# Patient Record
Sex: FEMALE | Race: ASIAN | NOT HISPANIC OR LATINO | URBAN - METROPOLITAN AREA
[De-identification: names, ages, dates, MRNs, and addresses within clinical notes are randomized per-mention and may not be internally consistent; named-entity substitution may affect disease eponyms.]

---

## 2021-01-26 ENCOUNTER — EMERGENCY (EMERGENCY)
Facility: HOSPITAL | Age: 51
LOS: 1 days | Discharge: HOME | End: 2021-01-26
Attending: HOSPITALIST | Admitting: EMERGENCY MEDICINE
Payer: MEDICAID

## 2021-01-26 VITALS
SYSTOLIC BLOOD PRESSURE: 144 MMHG | HEART RATE: 90 BPM | TEMPERATURE: 98 F | OXYGEN SATURATION: 100 % | DIASTOLIC BLOOD PRESSURE: 80 MMHG | RESPIRATION RATE: 18 BRPM

## 2021-01-26 LAB
ALBUMIN SERPL ELPH-MCNC: 4.1 G/DL — SIGNIFICANT CHANGE UP (ref 3.3–5)
ALP SERPL-CCNC: 29 U/L — LOW (ref 40–120)
ALT FLD-CCNC: 13 U/L — SIGNIFICANT CHANGE UP (ref 4–33)
ANION GAP SERPL CALC-SCNC: 11 MMOL/L — SIGNIFICANT CHANGE UP (ref 7–14)
APTT BLD: 23.3 SEC — LOW (ref 27–36.3)
AST SERPL-CCNC: 50 U/L — HIGH (ref 4–32)
BASOPHILS # BLD AUTO: 0.03 K/UL — SIGNIFICANT CHANGE UP (ref 0–0.2)
BASOPHILS NFR BLD AUTO: 0.4 % — SIGNIFICANT CHANGE UP (ref 0–2)
BILIRUB SERPL-MCNC: <0.2 MG/DL — SIGNIFICANT CHANGE UP (ref 0.2–1.2)
BLD GP AB SCN SERPL QL: NEGATIVE — SIGNIFICANT CHANGE UP
BUN SERPL-MCNC: 11 MG/DL — SIGNIFICANT CHANGE UP (ref 7–23)
CALCIUM SERPL-MCNC: 8.4 MG/DL — SIGNIFICANT CHANGE UP (ref 8.4–10.5)
CHLORIDE SERPL-SCNC: 102 MMOL/L — SIGNIFICANT CHANGE UP (ref 98–107)
CO2 SERPL-SCNC: 24 MMOL/L — SIGNIFICANT CHANGE UP (ref 22–31)
CREAT SERPL-MCNC: 0.53 MG/DL — SIGNIFICANT CHANGE UP (ref 0.5–1.3)
EOSINOPHIL # BLD AUTO: 0.13 K/UL — SIGNIFICANT CHANGE UP (ref 0–0.5)
EOSINOPHIL NFR BLD AUTO: 1.6 % — SIGNIFICANT CHANGE UP (ref 0–6)
GLUCOSE SERPL-MCNC: 93 MG/DL — SIGNIFICANT CHANGE UP (ref 70–99)
HCT VFR BLD CALC: 25.7 % — LOW (ref 34.5–45)
HGB BLD-MCNC: 7.6 G/DL — LOW (ref 11.5–15.5)
IANC: 5.34 K/UL — SIGNIFICANT CHANGE UP (ref 1.5–8.5)
IMM GRANULOCYTES NFR BLD AUTO: 1.2 % — SIGNIFICANT CHANGE UP (ref 0–1.5)
INR BLD: 1.11 RATIO — SIGNIFICANT CHANGE UP (ref 0.88–1.16)
LYMPHOCYTES # BLD AUTO: 2.12 K/UL — SIGNIFICANT CHANGE UP (ref 1–3.3)
LYMPHOCYTES # BLD AUTO: 25.8 % — SIGNIFICANT CHANGE UP (ref 13–44)
MCHC RBC-ENTMCNC: 27.1 PG — SIGNIFICANT CHANGE UP (ref 27–34)
MCHC RBC-ENTMCNC: 29.6 GM/DL — LOW (ref 32–36)
MCV RBC AUTO: 91.8 FL — SIGNIFICANT CHANGE UP (ref 80–100)
MONOCYTES # BLD AUTO: 0.51 K/UL — SIGNIFICANT CHANGE UP (ref 0–0.9)
MONOCYTES NFR BLD AUTO: 6.2 % — SIGNIFICANT CHANGE UP (ref 2–14)
NEUTROPHILS # BLD AUTO: 5.34 K/UL — SIGNIFICANT CHANGE UP (ref 1.8–7.4)
NEUTROPHILS NFR BLD AUTO: 64.8 % — SIGNIFICANT CHANGE UP (ref 43–77)
NRBC # BLD: 0 /100 WBCS — SIGNIFICANT CHANGE UP
NRBC # FLD: 0.02 K/UL — HIGH
PLATELET # BLD AUTO: 312 K/UL — SIGNIFICANT CHANGE UP (ref 150–400)
POTASSIUM SERPL-MCNC: 5.4 MMOL/L — HIGH (ref 3.5–5.3)
POTASSIUM SERPL-SCNC: 5.4 MMOL/L — HIGH (ref 3.5–5.3)
PROT SERPL-MCNC: 7.1 G/DL — SIGNIFICANT CHANGE UP (ref 6–8.3)
PROTHROM AB SERPL-ACNC: 12.6 SEC — SIGNIFICANT CHANGE UP (ref 10.6–13.6)
RBC # BLD: 2.8 M/UL — LOW (ref 3.8–5.2)
RBC # FLD: 14.1 % — SIGNIFICANT CHANGE UP (ref 10.3–14.5)
RH IG SCN BLD-IMP: POSITIVE — SIGNIFICANT CHANGE UP
RH IG SCN BLD-IMP: POSITIVE — SIGNIFICANT CHANGE UP
SODIUM SERPL-SCNC: 137 MMOL/L — SIGNIFICANT CHANGE UP (ref 135–145)
WBC # BLD: 8.23 K/UL — SIGNIFICANT CHANGE UP (ref 3.8–10.5)
WBC # FLD AUTO: 8.23 K/UL — SIGNIFICANT CHANGE UP (ref 3.8–10.5)

## 2021-01-26 PROCEDURE — 99218: CPT

## 2021-01-26 RX ORDER — ACETAMINOPHEN 500 MG
975 TABLET ORAL ONCE
Refills: 0 | Status: COMPLETED | OUTPATIENT
Start: 2021-01-26 | End: 2021-01-26

## 2021-01-26 RX ORDER — FERROUS SULFATE 325(65) MG
325 TABLET ORAL DAILY
Refills: 0 | Status: DISCONTINUED | OUTPATIENT
Start: 2021-01-26 | End: 2021-01-30

## 2021-01-26 RX ADMIN — Medication 975 MILLIGRAM(S): at 20:44

## 2021-01-26 NOTE — ED CDU PROVIDER INITIAL DAY NOTE - OBJECTIVE STATEMENT
# [id number]  HPI- Patient is a 50 y.o female with PMHx of hepatitis C and anemia who presents to ED sent by PCP for low hgb with vaginal bleeding x 12 days. Pt states that she has been having DUB, admits to previously being on an unknown "hormone pill" which she stopped 3 weeks ago. Then 12 days ago began to have very heavy vaginal bleeding. Admits to soaking through 6-8 diapers per day. States she saw her GYN who scheduled her for D&C w/ hysteroscopy 2/5, patient unable to fully clarify but states she believe she has a "cyst". Pt today has only changed her diaper once, states bleeding has improved. Pt went to PCP though for clearance for surgery and had routine BW showing her Hgb was 7.7 so sent patient to ED since she was c/o feeling fatigued. Pt denies fevers, chills, abd pain, cp, n/v, dizziness, or any other complaints. Pt seen and worked up in ED; Hgb 7.6. Labs WNL. Pt was transferred to CDU for 1 unit PRBC, rpt cbc, vitals q4 and frequent re-evals.

## 2021-01-26 NOTE — ED PROVIDER NOTE - CLINICAL SUMMARY MEDICAL DECISION MAKING FREE TEXT BOX
Spoke to pt with  [id number]. PMH Hep C C/O vaginal bleeding for the past 12 days. Pt states she is passing clots and has changed her pad 8 times today. Pt styates she was formerly on "hormone pills" 6 months ago then was off of it for 2 months and stopped it 30 days ago after another one month of taking it. Pt was referred to ED by pt's PMD for transfusion for symptomatic anemia. Pt denies any other sx or acute complaints, pt is currently well appearing, VSS.

## 2021-01-26 NOTE — ED PROVIDER NOTE - ATTENDING CONTRIBUTION TO CARE
PI ID for Arabic #[id number]    50F p/w vaginal bleeding for the past 12 days. states she was previously on pills to regulate her periods but has been off it for the past month. saw her gyn today who sent her to the ED for blood transfusion. scheduled for a d&c this month. has changed her pad about 8 times today.       ***GEN - NAD; well appearing; A+O x3 ***HEAD - NC/AT ***EYES/NOSE - PERRL, EOMI, mucous membranes moist, no discharge ***THROAT: Oral cavity and pharynx normal. No inflammation, swelling, exudate, or lesions.  ***NECK: Neck supple, non-tender without lymphadenopathy, no masses, no thyromegaly.   ***PULMONARY - CTA b/l, symmetric breath sounds. ***CARDIAC -s1s2, RRR, no M,G,R  ***ABDOMEN - +BS, ND, NT, soft, no guarding, no rebound, no masses   ***BACK - no CVA tenderness, Normal  spine ***EXTREMITIES - symmetric pulses, 2+ dp, capillary refill < 2 seconds, no clubbing, no cyanosis, no edema ***SKIN - no rash or bruising   ***NEUROLOGIC - alert, CN 2-12 intact    MDM: declining a pelvic exam and tvus as she already had one this morning with her gynecologist. stable vitals. labs, transfuse prn.

## 2021-01-26 NOTE — ED ADULT NURSE NOTE - OBJECTIVE STATEMENT
received pt to intake 13 aox3 in no apparent distress VSS c/o vaginal bleeding. Pt states bleeding today not as heavy as yesterday but endorses lower back and pelvic pain. Pt denies weakness, SOB, dizziness. Breaths equal and unlabored 20 G PIV L arm placed medicated for pain will continue to monitor

## 2021-01-26 NOTE — ED CDU PROVIDER INITIAL DAY NOTE - DETAILS
# [id number]  Patient is a 50 y.o female with PMHx of hepatitis C and anemia who presents to ED sent by PCP for low hgb with vaginal bleeding x 12 days. Pt seen and worked up in ED; Hgb 7.6. Labs WNL. Pt due for D&C w/ hysteroscopy 2/5 so was transferred to CDU for 1 unit PRBC, rpt cbc, vitals q4 and frequent re-evals.

## 2021-01-26 NOTE — ED CDU PROVIDER INITIAL DAY NOTE - MEDICAL DECISION MAKING DETAILS
# [id number]  Patient is a 50 y.o female with PMHx of hepatitis C and anemia who presents to ED sent by PCP for low hgb with vaginal bleeding x 12 days. Pt seen and worked up in ED; Hgb 7.6. Labs WNL. Pt due for D&C w/ hysteroscopy 2/5 so was transferred to CDU for 1 unit PRBC, rpt cbc, vitals q4 and frequent re-evals.

## 2021-01-26 NOTE — ED CDU PROVIDER INITIAL DAY NOTE - PHYSICAL EXAMINATION
Vital signs reviewed.   CONSTITUTIONAL: Well-appearing; well-nourished; in no apparent distress. Non-toxic appearing.   HEAD: Normocephalic, atraumatic.  EYES: PERRL, EOM intact, conjunctiva and sclera WNL.  CARD: Normal S1, S2; no murmurs, rubs, or gallops noted.  RESP: Normal chest excursion with respiration; breath sounds clear and equal bilaterally; no wheezes, rhonchi, or rales.  EXT/MS: moves all extremities  SKIN: Normal for age and race  NEURO: Awake, alert, oriented x 3, no gross deficits

## 2021-01-26 NOTE — ED PROVIDER NOTE - OBJECTIVE STATEMENT
Spoke to pt with  Spoke to pt with  [id number]. PMH Hep C C/O vaginal bleeding for the past Spoke to pt with  [id number]. PMH Hep C C/O vaginal bleeding for the past 12 days. Pt states she is passing clots and has changed her pad 8 times today. Pt styates she was formerly on "hormone pills" 6 months ago then was off of it for 2 months and stopped it 30 days ago after another one month of taking it. Pt states that her OB told her she needs surgery. Pt unsure if she has fibroids or what exact OB condition she has. Pt's paper states she is scheduled for a D+C with hysteroscopy and pt states she saw her OB Era Tao today and has a surgery date next month. Pt states she saw her PMD for pre-op clearance, states that her PMD found her Hg to be 7.7/23.9. Pt endorses generalized fatigue and shortness of breath with exertion for the past 6 days. Pt denies any other sx or acute complaints. Pt declines pelvic exam or U/S in the ER. Pt states she had both studies at her GYN today which uncomfortable. Pt denies any other sx or acute complaints.

## 2021-01-27 VITALS
RESPIRATION RATE: 18 BRPM | TEMPERATURE: 98 F | DIASTOLIC BLOOD PRESSURE: 75 MMHG | HEART RATE: 71 BPM | OXYGEN SATURATION: 100 % | SYSTOLIC BLOOD PRESSURE: 113 MMHG

## 2021-01-27 LAB
ANION GAP SERPL CALC-SCNC: 13 MMOL/L — SIGNIFICANT CHANGE UP (ref 7–14)
BUN SERPL-MCNC: 11 MG/DL — SIGNIFICANT CHANGE UP (ref 7–23)
CALCIUM SERPL-MCNC: 8.7 MG/DL — SIGNIFICANT CHANGE UP (ref 8.4–10.5)
CHLORIDE SERPL-SCNC: 101 MMOL/L — SIGNIFICANT CHANGE UP (ref 98–107)
CO2 SERPL-SCNC: 24 MMOL/L — SIGNIFICANT CHANGE UP (ref 22–31)
CREAT SERPL-MCNC: 0.59 MG/DL — SIGNIFICANT CHANGE UP (ref 0.5–1.3)
GLUCOSE SERPL-MCNC: 143 MG/DL — HIGH (ref 70–99)
HCT VFR BLD CALC: 26.4 % — LOW (ref 34.5–45)
HGB BLD-MCNC: 8.3 G/DL — LOW (ref 11.5–15.5)
MCHC RBC-ENTMCNC: 27.9 PG — SIGNIFICANT CHANGE UP (ref 27–34)
MCHC RBC-ENTMCNC: 31.4 GM/DL — LOW (ref 32–36)
MCV RBC AUTO: 88.6 FL — SIGNIFICANT CHANGE UP (ref 80–100)
NRBC # BLD: 0 /100 WBCS — SIGNIFICANT CHANGE UP
NRBC # FLD: 0.02 K/UL — HIGH
PLATELET # BLD AUTO: 244 K/UL — SIGNIFICANT CHANGE UP (ref 150–400)
POTASSIUM SERPL-MCNC: 3.9 MMOL/L — SIGNIFICANT CHANGE UP (ref 3.5–5.3)
POTASSIUM SERPL-SCNC: 3.9 MMOL/L — SIGNIFICANT CHANGE UP (ref 3.5–5.3)
RBC # BLD: 2.98 M/UL — LOW (ref 3.8–5.2)
RBC # FLD: 14.9 % — HIGH (ref 10.3–14.5)
SARS-COV-2 RNA SPEC QL NAA+PROBE: SIGNIFICANT CHANGE UP
SODIUM SERPL-SCNC: 138 MMOL/L — SIGNIFICANT CHANGE UP (ref 135–145)
WBC # BLD: 7.03 K/UL — SIGNIFICANT CHANGE UP (ref 3.8–10.5)
WBC # FLD AUTO: 7.03 K/UL — SIGNIFICANT CHANGE UP (ref 3.8–10.5)

## 2021-01-27 PROCEDURE — 99217: CPT

## 2021-01-27 NOTE — ED CDU PROVIDER SUBSEQUENT DAY NOTE - HISTORY
complains of pain/discomfort [employer]  ID#[id number].  Pt is a 51 y/o F PMHx of hepatitis C and anemia p/w anemia and vaginal bleeding x 13 days.  Pt reports daily vaginal bleeding for past 13 days, requiring use of diapers.  She saw OB/GYN with whom pt is planned to have D&X and hysteroscopy on 2/5.  On outpatient clearance with PMD pt found to be anemic, so sent to ED for evaluation.  Pt placed in observation unit for transfusion.   She reports not having to change diaper over the past 24 hours, noting, vaginal bleeding significantly improved.  Pt received 1 Unit PRBC, with which pt feels significantly better.  Pt denies fevers, chills, chest pain, use of blood thinners, or any other specific complaints.

## 2021-01-27 NOTE — ED CDU PROVIDER SUBSEQUENT DAY NOTE - MEDICAL DECISION MAKING DETAILS
Pt is a 51 y/o F PMHx of hepatitis C and anemia p/w anemia and vaginal bleeding x 13 days -- symptomatic anemia in context of heavy vaginal bleeding -- hgb improved to 8.3; pt appropriate for discharge to complete outpatient work up with her ob/gyn

## 2021-01-27 NOTE — ED ADULT NURSE REASSESSMENT NOTE - NS ED NURSE REASSESS COMMENT FT1
pt resting comfortably, denies any acute complaints. Blood transfusion started at 00:00,  phone used to explain plan of care and signs of transfusion reaction. PT able to verbalize understanding of symptoms and knows to notify RN. Transfusion running through a 20G IV to left arm with no signs of infiltration. Vitals are stable

## 2021-01-27 NOTE — ED CDU PROVIDER DISPOSITION NOTE - CLINICAL COURSE
[employer]  ID#[id number].  Pt is a 51 y/o F PMHx of hepatitis C and anemia p/w anemia and vaginal bleeding x 13 days.  Pt reports daily vaginal bleeding for past 13 days, requiring use of diapers.  She saw OB/GYN with whom pt is planned to have D&X and hysteroscopy on 2/5.  On outpatient clearance with PMD pt found to be anemic, so sent to ED for evaluation.  Pt placed in observation unit for transfusion.   She reports not having to change diaper over the past 24 hours, noting, vaginal bleeding significantly improved.  Pt received 1 Unit PRBC, with which pt feels significantly better.  Pt denies fevers, chills, chest pain, use of blood thinners, or any other specific complaints.  agree w above. pt has no complaints. not dizzy or lightheaded. no cp or sob. Feels comfortable w dc home. will follow up pmd one day.

## 2021-01-27 NOTE — ED CDU PROVIDER SUBSEQUENT DAY NOTE - PROGRESS NOTE DETAILS
Patient resting comfortably. Vitals stable. 1st unit running. Plan for rpt cbc in AM. Will continue with current CDU tx plan and monitor closely. Pt medically stable for discharge. Strict return precautions given.  Pt to follow up with PMD and OB/GYN.  Reassessment performed and plan for discharge discussed with Dr. Butt who agrees with disposition and discharge plan.  Discharge translation provided by [employer]  ID#[id number]

## 2021-01-27 NOTE — ED CDU PROVIDER SUBSEQUENT DAY NOTE - ATTENDING CONTRIBUTION TO CARE
Attending Statement: I have reviewed and agree with all pertinent clinical information, including history and physical exam and agree with treatment plan of the PA, except as noted.  [employer] [employer]  ID#[id number].  Pt is a 51 y/o F PMHx of hepatitis C and anemia p/w anemia and vaginal bleeding x 13 days.  Pt reports daily vaginal bleeding for past 13 days, requiring use of diapers.  She saw OB/GYN with whom pt is planned to have D&X and hysteroscopy on 2/5.  On outpatient clearance with PMD pt found to be anemic, so sent to ED for evaluation.  Pt placed in observation unit for transfusion.   She reports not having to change diaper over the past 24 hours, noting, vaginal bleeding significantly improved.  Pt received 1 Unit PRBC, with which pt feels significantly better.  Pt denies fevers, chills, chest pain, use of blood thinners, or any other specific complaints.  agree w above. pt has no complaints. not dizzy or lightheaded. no cp or sob. Feels comfortable w dc home. will follow up pmd one day.

## 2021-01-27 NOTE — ED CDU PROVIDER DISPOSITION NOTE - PATIENT PORTAL LINK FT
You can access the FollowMyHealth Patient Portal offered by [facility] by registering at the following website: [email or web address]. By joining [facility] FollowMyHealth portal, you will also be able to view your health information using other applications (apps) compatible with our system.

## 2021-01-27 NOTE — ED CDU PROVIDER DISPOSITION NOTE - NSFOLLOWUPINSTRUCTIONS_ED_ALL_ED_FT
Advance activity as tolerated.  Continue all previously prescribed medications as directed unless otherwise instructed.  Follow up with your primary care physician and ob/gyn in 48-72 hours- bring copies of your results.  Return to the ER for worsening or persistent symptoms, including but not limited to worsening/persistent lightheadedness, chest pain, shortness of breath, passing out, heavy vaginal bleeding requiring equal to or more than 2 pads in one hour and/or ANY NEW OR CONCERNING SYMPTOMS. If you have issues obtaining follow up, please call: [phone] to obtain a doctor or specialist who takes your insurance in your area.  You may call [phone] to make an appointment with the internal medicine clinic.

## 2021-02-03 NOTE — ED POST DISCHARGE NOTE - REASON FOR FOLLOW-UP
Other Dr Gaines office caled requesting we fax over patient's results from most recent visit. Faxed results to

## 2021-09-06 ENCOUNTER — OUTPATIENT (OUTPATIENT)
Dept: OUTPATIENT SERVICES | Facility: HOSPITAL | Age: 51
LOS: 1 days | End: 2021-09-06
Payer: MEDICAID

## 2021-09-06 DIAGNOSIS — Z11.52 ENCOUNTER FOR SCREENING FOR COVID-19: ICD-10-CM

## 2021-09-06 PROCEDURE — 87635 SARS-COV-2 COVID-19 AMP PRB: CPT

## 2021-09-07 PROBLEM — B19.20 UNSPECIFIED VIRAL HEPATITIS C WITHOUT HEPATIC COMA: Chronic | Status: ACTIVE | Noted: 2021-01-26

## 2021-09-07 LAB — SARS-COV-2 RNA SPEC QL NAA+PROBE: SIGNIFICANT CHANGE UP
